# Patient Record
Sex: FEMALE | Race: WHITE | NOT HISPANIC OR LATINO | Employment: PART TIME | URBAN - METROPOLITAN AREA
[De-identification: names, ages, dates, MRNs, and addresses within clinical notes are randomized per-mention and may not be internally consistent; named-entity substitution may affect disease eponyms.]

---

## 2017-09-20 ENCOUNTER — ALLSCRIPTS OFFICE VISIT (OUTPATIENT)
Dept: OTHER | Facility: OTHER | Age: 57
End: 2017-09-20

## 2017-10-18 ENCOUNTER — GENERIC CONVERSION - ENCOUNTER (OUTPATIENT)
Dept: OTHER | Facility: OTHER | Age: 57
End: 2017-10-18

## 2017-10-18 DIAGNOSIS — Z12.31 ENCOUNTER FOR SCREENING MAMMOGRAM FOR MALIGNANT NEOPLASM OF BREAST: ICD-10-CM

## 2017-12-12 ENCOUNTER — GENERIC CONVERSION - ENCOUNTER (OUTPATIENT)
Dept: OBGYN CLINIC | Facility: CLINIC | Age: 57
End: 2017-12-12

## 2017-12-20 ENCOUNTER — GENERIC CONVERSION - ENCOUNTER (OUTPATIENT)
Dept: OTHER | Facility: OTHER | Age: 57
End: 2017-12-20

## 2017-12-20 ENCOUNTER — ALLSCRIPTS OFFICE VISIT (OUTPATIENT)
Dept: OTHER | Facility: OTHER | Age: 57
End: 2017-12-20

## 2017-12-20 DIAGNOSIS — N64.4 MASTODYNIA: ICD-10-CM

## 2017-12-21 NOTE — PROGRESS NOTES
Assessment   1  Breast pain, left (611 71) (N64 4)    Plan   Breast pain, left    · *US BREAST LEFT LIMITED (DIAGNOSTIC); Status:Hold For - Scheduling; Requested    for:88Vgb0745;    Perform:[facility]iology; Due:83Exy0779; Ordered; For:Breast pain, left; Ordered By:Samantha Matta;    Discussion/Summary   Discussion Summary:    Reassured pt most likely musculoskeletal, pt still concerned, ordered L breast US for pt reassurance US WNL, pain continues rec f/u with plastic surgeon who performed augmentation or RTO if symptoms worsen or do not improve  Chief Complaint   Chief Complaint Free Text Note Form: Acute Visit here for breast check, states she is having breast tenderness on left breast for a few weeks  Pt does self breast check daily and denies any lumps  History of Present Illness   HPI: 55 y/o female who presents to the office L breast pain x 3 wks  Pt states that the pain started before her most recent mammo, mammo WNL  But pt concerned because has B/L silicone breast implants and mammo tech had difficult time getting all views  Pt reports that the pain is worsened when she lifts her L arm up  Pt denies any nipple d/c, palpable lump, or axillary lymph nodes  Review of Systems   Focused-Female:      Constitutional: No fever, no chills, feels well, no tiredness, no recent weight gain or loss  Breasts: breast pain, but-- no breast swelling,-- no reddening of the breast,-- no breast lump,-- no breast itching-- and-- no nipple discharge  Active Problems   1  Asymptomatic age-related postmenopausal state (V49 81) (Z78 0)   2  Bacterial vaginosis (616 10,041 9) (N76 0,B96 89)   3  Encounter for gynecological examination without abnormal finding (V72 31) (Z01 419)   4  Encounter for screening mammogram for malignant neoplasm of breast (V76 12)     (Z12 31)   5  Height loss (781 91) (R29 890)    Past Medical History   1  History of Anxiety (300 00) (F41 9)   2   History of  3 (V22 2) (Z33 1)   3  History of spontaneous  (V13 29) (Z87 59)   4  History of thyroid disease (V12 29) (Z86 39)   5  History of Hot flashes (627 2) (N95 1)   6  History of Menarche (V21 8)   7  History of Menopause (627 2) (Z78 0)   8  History of  (spontaneous vaginal delivery) (650) (O80)   9  History of Twin birth (651 00) (Z38 5)  Active Problems And Past Medical History Reviewed: The active problems and past medical history were reviewed and updated today  Surgical History   1  History of Breast Surgery Enlargement Procedure   2  History of  Section   3  History of Complete Colonoscopy   4  History of Surgical Treatment Of Missed    5  History of Tonsillectomy  Surgical History Reviewed: The surgical history was reviewed and updated today  Family History   Mother    1  Family history of Diabetes Mellitus (V18 0)   2  Family history of atrial fibrillation (V17 49) (Z82 49)   3  Family history of congestive heart failure (V17 49) (Z82 49)   4  Family history of rheumatoid arthritis (V17 7) (Z82 61)  Father    5  Family history of cerebrovascular accident (V17 1) (Z82 3)  Maternal Aunt    6  Family history of malignant neoplasm of breast (V16 3) (Z80 3)  Family History    7  Family history of Anemia (V18 2)   8  Family history of Arthritis (V17 7)   9  Family history of Blood Transfusion (___ Ml)   10  Family history of Hypertension (V17 49)   11  Family history of Reported Family History Of Heart Disease   12  Family history of Thyroid Disorder (V18 19)  Family History Reviewed: The family history was reviewed and updated today  Social History    · Denied: History of Being A Social Drinker   · Denied: History of Current Some Day Smoker   · Daily Coffee Consumption (2  Cups/Day)   · Lack of exercise (V69 0) (Z72 3)   · Never a smoker   · Partner had vasectomy  Social History Reviewed: The social history was reviewed and updated today   The social history was reviewed and is unchanged  Current Meds    1  Calcium 600 + D TABS; Therapy: (Recorded:00Ref5930) to Recorded   2  Centrum Silver Oral Tablet; Therapy: (Recorded:30Zig4372) to Recorded   3  Citalopram Hydrobromide 20 MG Oral Tablet; Therapy: 46JBI5522 to (Last XR:86EUS3870)  Requested for: 99VBY2961 Ordered   4  Multi-Day Vitamins TABS; Therapy: (Recorded:00Mdp1402) to Recorded  Medication List Reviewed: The medication list was reviewed and updated today  Allergies   1  Minocycline HCl CAPS    Vitals   Vital Signs    Recorded: 86INI6213 26:16IP   Systolic 054, RUE, Sitting   Diastolic 60, RUE, Sitting   Weight 131 lb    BMI Calculated 21 47   BSA Calculated 1 66   LMP      Physical Exam        Constitutional      General appearance: No acute distress, well appearing and well nourished  Chest      Breasts: Normal and no dimpling or skin changes noted  implants noted        Psychiatric      Orientation to person, place, and time: Normal        Mood and affect: Normal        Future Appointments      Date/Time Provider Specialty Site   10/24/2018 01:40 PM Kat Evans, [address] [place] [address] [facility]     Signatures    Electronically signed by : Deedee Collins [facility]; Dec 20 2017  3:24PM EST                       (Author)     Electronically signed by : RODNEY Anglin ; Dec 20 2017  4:37PM EST

## 2018-01-11 NOTE — MISCELLANEOUS
Provider Comments  Provider Comments:   CALLED PT LMTCB AND R/S APPT  Signatures   Electronically signed by :  Michele Cervantes, ; Sep 20 2017 10:34AM EST                       (Author)

## 2018-01-22 VITALS — SYSTOLIC BLOOD PRESSURE: 102 MMHG | WEIGHT: 131 LBS | BODY MASS INDEX: 21.47 KG/M2 | DIASTOLIC BLOOD PRESSURE: 60 MMHG

## 2018-01-22 VITALS
HEIGHT: 66 IN | WEIGHT: 130 LBS | SYSTOLIC BLOOD PRESSURE: 100 MMHG | DIASTOLIC BLOOD PRESSURE: 68 MMHG | BODY MASS INDEX: 20.89 KG/M2

## 2018-01-23 NOTE — MISCELLANEOUS
Message   Recorded as Task   Date: 12/20/2017 10:13 AM, Created By: Cherrie Spencer   Task Name: Call Back   Assigned To: Leanne Graves   Regarding Patient: Nathanael Villagran, Status: In Progress   Comment:    Starla He - 20 Dec 2017 10:13 AM     TASK CREATED  Caller: Self; Results Inquiry; [phone] (Mobile Phone)  Pt calling for results of Eddie Solange - 20 Dec 2017 10:17 AM     TASK IN PROGRESS   Fe Ashley - 20 Dec 2017 10:31 AM     TASK EDITED  Pt said she had breast pain when she had mammo - but didn't say anything  Pt wants ov  Done        Active Problems    1  Asymptomatic age-related postmenopausal state (V49 81) (Z78 0)   2  Bacterial vaginosis (616 10,041 9) (N76 0,B96 89)   3  Encounter for gynecological examination without abnormal finding (V72 31) (Z01 419)   4  Encounter for screening mammogram for malignant neoplasm of breast (V76 12)   (Z12 31)   5  Height loss (781 91) (R29 890)    Current Meds   1  Calcium 600 + D TABS; Therapy: (Recorded:40Sdv8175) to Recorded   2  Centrum Silver Oral Tablet; Therapy: (Recorded:07Nec8389) to Recorded   3  Citalopram Hydrobromide 20 MG Oral Tablet; Therapy: 84PCH2142 to (Last YH:35WCU4591)  Requested for: 93MLO6391 Ordered   4  Multi-Day Vitamins TABS; Therapy: (Recorded:84Oet3353) to Recorded    Allergies    1  Minocycline HCl CAPS    Signatures   Electronically signed by :  Guy Albert, ; Dec 20 2017 10:31AM EST                       (Author)

## 2018-11-30 ENCOUNTER — ANNUAL EXAM (OUTPATIENT)
Dept: OBGYN CLINIC | Facility: MEDICAL CENTER | Age: 58
End: 2018-11-30
Payer: COMMERCIAL

## 2018-11-30 VITALS
SYSTOLIC BLOOD PRESSURE: 102 MMHG | BODY MASS INDEX: 21.69 KG/M2 | DIASTOLIC BLOOD PRESSURE: 60 MMHG | HEIGHT: 66 IN | WEIGHT: 135 LBS

## 2018-11-30 DIAGNOSIS — Z12.31 SCREENING MAMMOGRAM, ENCOUNTER FOR: Primary | ICD-10-CM

## 2018-11-30 DIAGNOSIS — Z01.419 ENCOUNTER FOR GYNECOLOGICAL EXAMINATION WITHOUT ABNORMAL FINDING: ICD-10-CM

## 2018-11-30 DIAGNOSIS — R92.2 DENSE BREAST TISSUE: ICD-10-CM

## 2018-11-30 DIAGNOSIS — N95.2 ATROPHIC VAGINITIS: ICD-10-CM

## 2018-11-30 PROCEDURE — S0612 ANNUAL GYNECOLOGICAL EXAMINA: HCPCS | Performed by: PHYSICIAN ASSISTANT

## 2018-11-30 RX ORDER — ESTRADIOL 0.1 MG/G
CREAM VAGINAL
Qty: 42.5 G | Refills: 2 | Status: SHIPPED | OUTPATIENT
Start: 2018-11-30

## 2018-11-30 RX ORDER — CITALOPRAM 20 MG/1
TABLET ORAL
COMMUNITY
Start: 2018-11-20

## 2018-11-30 NOTE — ASSESSMENT & PLAN NOTE
Benign findings on routine gyn exam  Recommended monthly SBE, annual CBE and annual screening mammo  ASCCP guidelines reviewed and pap with cotesting noted to be up to date; this low risk patient was advised she meets criteria to d/c pap screening at age 74  Colonoscopy noted to be up to date  The patient denies STI risk factors and declines testing at this time  Reviewed diet/activity recommendations  Discussed postmenopausal considerations and symptoms to report   RTO in one year for routine annual gyn exam or sooner PRN

## 2018-11-30 NOTE — ASSESSMENT & PLAN NOTE
Reviewed tx options - Will rx trial of Estrace cream, discussed correct use/administration and possible SE  All questions answered

## 2018-11-30 NOTE — PROGRESS NOTES
Assessment/Plan:    Atrophic vaginitis  Reviewed tx options - Will rx trial of Estrace cream, discussed correct use/administration and possible SE  All questions answered      Encounter for gynecological examination without abnormal finding  Benign findings on routine gyn exam  Recommended monthly SBE, annual CBE and annual screening mammo  ASCCP guidelines reviewed and pap with cotesting noted to be up to date; this low risk patient was advised she meets criteria to d/c pap screening at age 74  Colonoscopy noted to be up to date  The patient denies STI risk factors and declines testing at this time  Reviewed diet/activity recommendations  Discussed postmenopausal considerations and symptoms to report  RTO in one year for routine annual gyn exam or sooner PRN       Diagnoses and all orders for this visit:    Screening mammogram, encounter for  -     Mammo screening bilateral w 3d & cad; Future    Dense breast tissue  -     Mammo screening bilateral w 3d & cad; Future    Encounter for gynecological examination without abnormal finding    Atrophic vaginitis  -     estradiol (ESTRACE) 0 1 mg/g vaginal cream; Insert 1g PV nightly x 2 weeks, then twice weekly    Other orders  -     citalopram (CeleXA) 20 mg tablet;         Subjective:      Patient ID: Aisha Victor is a 66 y o  female  The patient comes in today for annual Gyn exam  The patient is postmenopausal - no hx PMB, pelvic pain, abnormal vaginal discharge, breast mass or lumps, urinary symptoms, urinary incontinence, depression, and pelvic/vaginal pressure  States she has vaginal pain, rikki w/ intercourse since going through menopause  Has tried OTC lubricants w/out success  Pt reports all additional systems reviewed are negative  The patient admits to monthly self breast exams, regular exercise, healthy diet, contraception use (postmenopausal), and calcium and Vitamin D intake    PAP up to date, HPV neg/cytology WNL in 2016  mammo 2017, WNL  No sig family hx  Pt is a  at [address] [place] [address] Exam   The patient's pertinent negatives include no pelvic pain or vaginal discharge  Pertinent negatives include no abdominal pain, constipation, diarrhea, headaches, nausea or vomiting  The following portions of the patient's history were reviewed and updated as appropriate: allergies, current medications, past family history, past medical history, past social history, past surgical history and problem list     Review of Systems   Constitutional: Negative for appetite change, fatigue and unexpected weight change  Respiratory: Negative for chest tightness and shortness of breath  Cardiovascular: Negative for chest pain, palpitations and leg swelling  Gastrointestinal: Negative for abdominal pain, constipation, diarrhea, nausea and vomiting  Genitourinary: Positive for dyspareunia and vaginal pain  Negative for difficulty urinating, menstrual problem, pelvic pain and vaginal discharge  Musculoskeletal: Negative for arthralgias and myalgias  Neurological: Negative for dizziness, light-headedness and headaches  Psychiatric/Behavioral: Negative for dysphoric mood  The patient is not nervous/anxious  All other systems reviewed and are negative  Objective:      /60 (BP Location: Left arm, Patient Position: Sitting)   Ht 5' 5 5" (1 664 m)   Wt 61 2 kg (135 lb)   BMI 22 12 kg/m²          Physical Exam   Constitutional: She is oriented to person, place, and time  She appears well-developed and well-nourished  HENT:   Head: Normocephalic and atraumatic  Neck: Neck supple  No thyromegaly present  Cardiovascular: Normal rate and regular rhythm  Pulmonary/Chest: Effort normal and breath sounds normal  Right breast exhibits no inverted nipple, no mass, no nipple discharge, no skin change and no tenderness  Left breast exhibits no inverted nipple, no mass, no nipple discharge, no skin change and no tenderness     Abdominal: Soft  Bowel sounds are normal  There is no tenderness  Hernia confirmed negative in the right inguinal area and confirmed negative in the left inguinal area  Genitourinary: Vagina normal and uterus normal  No breast swelling, tenderness, discharge or bleeding  Pelvic exam was performed with patient supine  There is no rash, tenderness, lesion or injury on the right labia  There is no rash, tenderness, lesion or injury on the left labia  Uterus is not deviated, not enlarged, not fixed and not tender  Cervix exhibits no motion tenderness, no discharge and no friability  Right adnexum displays no mass, no tenderness and no fullness  Left adnexum displays no mass, no tenderness and no fullness  No erythema, tenderness or bleeding in the vagina  No signs of injury around the vagina  No vaginal discharge found  Genitourinary Comments: Atrophic changes noted    Neurological: She is alert and oriented to person, place, and time  Skin: Skin is warm and dry  Psychiatric: She has a normal mood and affect  Nursing note and vitals reviewed

## 2019-12-10 ENCOUNTER — ANNUAL EXAM (OUTPATIENT)
Dept: OBGYN CLINIC | Facility: MEDICAL CENTER | Age: 59
End: 2019-12-10
Payer: COMMERCIAL

## 2019-12-10 VITALS
BODY MASS INDEX: 21.69 KG/M2 | HEIGHT: 67 IN | DIASTOLIC BLOOD PRESSURE: 76 MMHG | WEIGHT: 138.2 LBS | SYSTOLIC BLOOD PRESSURE: 100 MMHG

## 2019-12-10 DIAGNOSIS — Z01.419 ENCOUNTER FOR GYNECOLOGICAL EXAMINATION WITHOUT ABNORMAL FINDING: Primary | ICD-10-CM

## 2019-12-10 DIAGNOSIS — Z12.31 ENCOUNTER FOR SCREENING MAMMOGRAM FOR MALIGNANT NEOPLASM OF BREAST: ICD-10-CM

## 2019-12-10 DIAGNOSIS — Z11.51 SCREENING FOR HPV (HUMAN PAPILLOMAVIRUS): ICD-10-CM

## 2019-12-10 DIAGNOSIS — N95.2 ATROPHIC VAGINITIS: ICD-10-CM

## 2019-12-10 PROCEDURE — G0145 SCR C/V CYTO,THINLAYER,RESCR: HCPCS | Performed by: NURSE PRACTITIONER

## 2019-12-10 PROCEDURE — 87624 HPV HI-RISK TYP POOLED RSLT: CPT | Performed by: NURSE PRACTITIONER

## 2019-12-10 PROCEDURE — S0612 ANNUAL GYNECOLOGICAL EXAMINA: HCPCS | Performed by: NURSE PRACTITIONER

## 2019-12-10 NOTE — ASSESSMENT & PLAN NOTE
Benign findings on routine gyn exam  Recommended monthly SBE, annual CBE and annual screening mammo  ASCCP guidelines reviewed and pap with cotesting done today; this low risk patient was advised she meets criteria to d/c pap screening at age 74  Colonoscopy noted to be up to date-patient states 4-5 years ago  The patient denies STI risk factors and declines testing at this time  Reviewed diet/activity recommendations:  Encouraged daily Ca++ and vitamin D intake as well as daily weight bearing exercise for promotion of bone health    Discussed postmenopausal considerations and symptoms to report  RTO in one year for routine annual gyn exam or sooner PRN

## 2019-12-10 NOTE — PATIENT INSTRUCTIONS
Vaginal Atrophy   WHAT YOU NEED TO KNOW:   What is vaginal atrophy? Vaginal atrophy is a condition that causes thinning, drying, and inflammation of vaginal tissue  This condition is caused by decreased levels of estrogen (a female sex hormone)  Vaginal atrophy can increase your risk for vaginal and urinary tract infections  Vaginal atrophy can worsen over time if not treated  What causes or increases your risk of vaginal atrophy? · Menopause     · Medicines that lower your estrogen levels, such as those used to treat breast cancer, endometriosis, or fibroids    · Radiation to your pelvic area     · Surgery to remove the ovaries    · Breastfeeding  What are the signs and symptoms of vaginal atrophy? · Vaginal dryness, itching, and burning    · Vaginal discharge    · Pain or discomfort during sex    · Light bleeding after sex    · Burning during urination    · Frequent, sudden, strong urges to urinate    · Urinary incontinence (loss of control of your bladder)  How is vaginal atrophy diagnosed? Your healthcare provider will ask about your symptoms  A pelvic exam will be done to examine your vagina and cervix  Your healthcare provider will place a speculum into your vagina to open and examine it  A sample of discharge from your vagina may be collected and tested  A urine test may also be done  How is vaginal atrophy treated? · Over-the counter vaginal moisturizers  can help reduce dryness  Your healthcare provider may recommend that you use a vaginal moisturizer several times each week and during sex  Only use creams that are made for vaginal use  Do  not  use petroleum jelly  Lubricants can be used during sex to decrease pain and discomfort  · Estrogen  may help decrease dryness  It may also lower your risk of vaginal infections if you are going through menopause  It can also help to relieve urinary symptoms  Estrogen may be prescribed in the form of a cream, tablet, or ring   These medicines can be applied or inserted into the vagina  Estrogen can also be prescribed in the form of a pill  When should I contact my healthcare provider? · You have a foul-smelling odor coming from your vagina  · You have a thick, cheese-like discharge from your vagina  · You have itching, swelling, or redness in your vagina  · You have pain or burning when you urinate  · Your urine smells bad  · Your symptoms do not improve, or they get worse  · You have questions or concerns about your condition or care  CARE AGREEMENT:   You have the right to help plan your care  Learn about your health condition and how it may be treated  Discuss treatment options with your caregivers to decide what care you want to receive  You always have the right to refuse treatment  The above information is an  only  It is not intended as medical advice for individual conditions or treatments  Talk to your doctor, nurse or pharmacist before following any medical regimen to see if it is safe and effective for you  © 2017 [employer] Information is for End User's use only and may not be sold, redistributed or otherwise used for commercial purposes  All illustrations and images included in CareNotes® are the copyrighted property of [employer]  or [employer]

## 2019-12-10 NOTE — ASSESSMENT & PLAN NOTE
Patient never started estrogen cream   Will consider starting now  Also recommend water based lubricants

## 2019-12-10 NOTE — PROGRESS NOTES
Assessment/Plan:    Encounter for gynecological examination without abnormal finding  Benign findings on routine gyn exam  Recommended monthly SBE, annual CBE and annual screening mammo  ASCCP guidelines reviewed and pap with cotesting done today; this low risk patient was advised she meets criteria to d/c pap screening at age 74  Colonoscopy noted to be up to date-patient states 4-5 years ago  The patient denies STI risk factors and declines testing at this time  Reviewed diet/activity recommendations:  Encouraged daily Ca++ and vitamin D intake as well as daily weight bearing exercise for promotion of bone health    Discussed postmenopausal considerations and symptoms to report  RTO in one year for routine annual gyn exam or sooner PRN  Atrophic vaginitis  Patient never started estrogen cream   Will consider starting now  Also recommend water based lubricants  Diagnoses and all orders for this visit:    Encounter for gynecological examination without abnormal finding    Encounter for screening mammogram for malignant neoplasm of breast  -     Mammo screening bilateral w 3d & cad; Future    Atrophic vaginitis        Taylor Deirdre   1960    CC:  Yearly exam    S:Twyla is a  64 y o  female here for yearly exam  She is postmenopausal and has had no vaginal bleeding  She denies abnormal vaginal discharge, itching or odor  She does report vaginal dryness  She denies breast concerns, abdominal/pelvic pain or bladder/bowel dysfunction  Sexual activity: She is rarely sexually active due to pain with IC secondary to atrophic changes  STD testing: She does not want STD testing today  She works at [employer]  She has a 23year old son and twins (boy and girl) conceived with IVF        Last Pap/HPV: 9/16 neg/neg   Last Mammo: 12/17 BRAYAN 1  SBE: monthly   Last Colonoscopy: states up to date    Family hx of breast cancer: maternal aunt   Family hx of ovarian cancer: denies   Family hx of colon cancer: paternal aunt     Current Outpatient Medications:     citalopram (CeleXA) 20 mg tablet, , Disp: , Rfl:     estradiol (ESTRACE) 0 1 mg/g vaginal cream, Insert 1g PV nightly x 2 weeks, then twice weekly (Patient not taking: Reported on 12/10/2019), Disp: 42 5 g, Rfl: 2  Social History     Socioeconomic History    Marital status: /Civil Union     Spouse name: Not on file    Number of children: Not on file    Years of education: Not on file    Highest education level: Not on file   Occupational History    Not on file   Social Needs    Financial resource strain: Not on file    Food insecurity:     Worry: Not on file     Inability: Not on file    Transportation needs:     Medical: Not on file     Non-medical: Not on file   Tobacco Use    Smoking status: Never Smoker    Smokeless tobacco: Never Used   Substance and Sexual Activity    Alcohol use: Not Currently    Drug use: Never    Sexual activity: Not Currently     Birth control/protection: Post-menopausal   Lifestyle    Physical activity:     Days per week: Not on file     Minutes per session: Not on file    Stress: Not on file   Relationships    Social connections:     Talks on phone: Not on file     Gets together: Not on file     Attends Islam service: Not on file     Active member of club or organization: Not on file     Attends meetings of clubs or organizations: Not on file     Relationship status: Not on file    Intimate partner violence:     Fear of current or ex partner: Not on file     Emotionally abused: Not on file     Physically abused: Not on file     Forced sexual activity: Not on file   Other Topics Concern    Not on file   Social History Narrative    Not on file     Family History   Problem Relation Age of Onset    Atrial fibrillation Mother     Diabetes Mother     Heart failure Mother     Rheum arthritis Mother     Heart disease Father     Thyroid disease Sister     Thyroid disease Brother     No Known Problems Maternal Grandmother     No Known Problems Maternal Grandfather     No Known Problems Paternal Grandmother     No Known Problems Paternal Grandfather     Thyroid disease Sister      History reviewed  No pertinent past medical history  Review of Systems   Constitutional: Negative for appetite change, fatigue and unexpected weight change  Respiratory: Negative for shortness of breath  Cardiovascular: Negative for chest pain and leg swelling  Gastrointestinal: Negative for abdominal pain  Endocrine: Negative for cold intolerance and heat intolerance  Breasts:  Negative for breast tenderness or masses  Genitourinary: Negative  Negative for , dysuria, flank pain, frequency, genital sores, hematuria, post menopausal bleeding and pelvic pain  Positive for dyspareunia and vaginal dryness  Negative for stress incontinence  Musculoskeletal: Negative for back pain  Neurological: Negative for headaches  O:  Blood pressure 100/76, height 5' 7" (1 702 m), weight 62 7 kg (138 lb 3 2 oz)  Patient appears well and is not in distress  Neck is supple without masses  Normal thyroid  Heart regular rate and rhythm  Lungs CTA bilaterally   Breasts are symmetrical without mass, tenderness, nipple discharge, skin changes or adenopathy  + silicone implants bilaterally  Abdomen is soft and nontender without masses  External genitals are normal without lesions or rashes  Urethral meatus and urethra are normal  Bladder is normal to palpation  Vagina is normal without discharge or bleeding  Atrophic changes noted  Cervix is normal without discharge or lesion  Uterus is normal, mobile, nontender without palpable mass  Adnexa are normal, nontender, without palpable mass

## 2019-12-12 LAB
HPV HR 12 DNA CVX QL NAA+PROBE: NEGATIVE
HPV16 DNA CVX QL NAA+PROBE: NEGATIVE
HPV18 DNA CVX QL NAA+PROBE: NEGATIVE

## 2019-12-13 LAB
LAB AP GYN PRIMARY INTERPRETATION: NORMAL
Lab: NORMAL

## 2019-12-16 ENCOUNTER — TELEPHONE (OUTPATIENT)
Dept: OBGYN CLINIC | Facility: MEDICAL CENTER | Age: 59
End: 2019-12-16

## 2020-01-03 ENCOUNTER — TELEPHONE (OUTPATIENT)
Dept: OBGYN CLINIC | Facility: CLINIC | Age: 60
End: 2020-01-03

## 2020-01-03 NOTE — TELEPHONE ENCOUNTER
Working the over due bin, contacted pt and informed friendly reminder mammogram is over due  Pt stated she doesn't like doing them and that's why she has been postponing  Pt stated she may do it in the summer and requested I call her back in the summer   I postponed mammogram order til 06/2020

## 2020-06-18 ENCOUNTER — TELEPHONE (OUTPATIENT)
Dept: OBGYN CLINIC | Facility: CLINIC | Age: 60
End: 2020-06-18

## 2021-03-15 ENCOUNTER — ANNUAL EXAM (OUTPATIENT)
Dept: OBGYN CLINIC | Facility: MEDICAL CENTER | Age: 61
End: 2021-03-15
Payer: COMMERCIAL

## 2021-03-15 VITALS — DIASTOLIC BLOOD PRESSURE: 70 MMHG | WEIGHT: 138.2 LBS | BODY MASS INDEX: 21.65 KG/M2 | SYSTOLIC BLOOD PRESSURE: 100 MMHG

## 2021-03-15 DIAGNOSIS — Z01.419 ENCOUNTER FOR GYNECOLOGICAL EXAMINATION WITHOUT ABNORMAL FINDING: Primary | ICD-10-CM

## 2021-03-15 PROCEDURE — S0612 ANNUAL GYNECOLOGICAL EXAMINA: HCPCS | Performed by: NURSE PRACTITIONER

## 2021-03-15 NOTE — ASSESSMENT & PLAN NOTE
Benign findings on routine gyn exam  Recommended monthly SBE, annual CBE and annual screening mammo  ASCCP guidelines reviewed and pap with cotesting noted to be up to date; this low risk patient was advised she meets criteria to d/c pap screening at age 74  Colonoscopy noted to be up to date  The patient denies STI risk factors and declines testing at this time  Reviewed diet/activity recommendations:  Encouraged daily Ca++ and vitamin D intake as well as daily weight bearing exercise for promotion of bone health    Discussed postmenopausal considerations and symptoms to report  RTO in one year for routine annual gyn exam or sooner PRN

## 2021-09-09 ENCOUNTER — TELEPHONE (OUTPATIENT)
Dept: GASTROENTEROLOGY | Facility: CLINIC | Age: 61
End: 2021-09-09

## 2022-03-29 ENCOUNTER — ANNUAL EXAM (OUTPATIENT)
Dept: OBGYN CLINIC | Facility: MEDICAL CENTER | Age: 62
End: 2022-03-29
Payer: COMMERCIAL

## 2022-03-29 VITALS
BODY MASS INDEX: 21.69 KG/M2 | HEIGHT: 66 IN | DIASTOLIC BLOOD PRESSURE: 62 MMHG | SYSTOLIC BLOOD PRESSURE: 100 MMHG | WEIGHT: 135 LBS

## 2022-03-29 DIAGNOSIS — Z12.31 ENCOUNTER FOR SCREENING MAMMOGRAM FOR MALIGNANT NEOPLASM OF BREAST: ICD-10-CM

## 2022-03-29 DIAGNOSIS — Z12.11 ENCOUNTER FOR SCREENING FOR MALIGNANT NEOPLASM OF COLON: Primary | ICD-10-CM

## 2022-03-29 DIAGNOSIS — Z01.419 ENCOUNTER FOR GYNECOLOGICAL EXAMINATION WITHOUT ABNORMAL FINDING: ICD-10-CM

## 2022-03-29 PROCEDURE — S0612 ANNUAL GYNECOLOGICAL EXAMINA: HCPCS | Performed by: NURSE PRACTITIONER

## 2022-03-29 RX ORDER — CHLORAL HYDRATE 500 MG
2 CAPSULE ORAL DAILY
COMMUNITY

## 2022-03-29 RX ORDER — B-COMPLEX WITH VITAMIN C
1 TABLET ORAL 2 TIMES DAILY WITH MEALS
COMMUNITY

## 2022-03-29 RX ORDER — FEXOFENADINE HCL 180 MG/1
180 TABLET ORAL DAILY
COMMUNITY

## 2022-03-29 RX ORDER — DIPHENOXYLATE HYDROCHLORIDE AND ATROPINE SULFATE 2.5; .025 MG/1; MG/1
1 TABLET ORAL DAILY
COMMUNITY

## 2022-03-29 NOTE — PROGRESS NOTES
Encounter for gynecological examination without abnormal finding  Benign findings on routine gyn exam  Recommended monthly SBE, annual CBE and annual screening mammo  ASCCP guidelines reviewed and pap with cotesting noted to be up to date; this low risk patient was advised she meets criteria to d/c pap screening at age 74  Colonoscopy up to date  The patient denies STI risk factors and declines testing at this time  Reviewed diet/activity recommendations:  Encouraged daily Ca++ and vitamin D intake as well as daily weight bearing exercise for promotion of bone health    Discussed postmenopausal considerations and symptoms to report  RTO in one year for routine annual gyn exam or sooner PRN  Diagnoses and all orders for this visit:    Encounter for screening for malignant neoplasm of colon  -     Cancel: Ambulatory referral to Gastroenterology; Future    Encounter for screening mammogram for malignant neoplasm of breast  -     Mammo screening bilateral w 3d & cad; Future    Encounter for gynecological examination without abnormal finding         Jahaira Jessica   1960    CC:  Yearly exam    S:  Jahaira Jessica is a 63 y o  female here for yearly exam  She is postmenopausal since her early 46s and has had no vaginal bleeding  She denies abnormal vaginal discharge, itching, odor or dryness  She denies breast concerns, abdominal/pelvic pain or bladder/bowel dysfunction  Denies stress incontinence and has rare hot flashes  Sexual activity: She is not sexually active due to pain with IC secondary to atrophic changes  Never started estrace cream  States she is OK with not being sexually active  Not interested in medication for atrophy or HRT  Advised to call if changes her mind-could try osphena or intrarosa  STD testing: She does not want STD testing today       Last Pap: 12/19 neg/neg  Last Mammo: 9/21 BRAYAN 1  SBE: yes   Last Colonoscopy:2017 per patient due in 10    We reviewed ASCCP guidelines for Pap testing       Family hx of breast cancer: maternal aunt   Family hx of ovarian cancer: denies   Family hx of colon cancer: paternal aunt    Works in [employer]     Current Outpatient Medications:     ascorbic acid (VITAMIN C) 1000 MG tablet, Take 1,000 mg by mouth daily, Disp: , Rfl:     calcium carbonate-vitamin D (OSCAL-D) 500 mg-200 units per tablet, Take 1 tablet by mouth 2 (two) times a day with meals, Disp: , Rfl:     citalopram (CeleXA) 20 mg tablet, , Disp: , Rfl:     fexofenadine (ALLEGRA) 180 MG tablet, Take 180 mg by mouth daily, Disp: , Rfl:     multivitamin (THERAGRAN) TABS, Take 1 tablet by mouth daily, Disp: , Rfl:     Omega-3 1000 MG CAPS, Take 2 g by mouth daily, Disp: , Rfl:     estradiol (ESTRACE) 0 1 mg/g vaginal cream, Insert 1g PV nightly x 2 weeks, then twice weekly (Patient not taking: Reported on 12/10/2019), Disp: 42 5 g, Rfl: 2  Social History     Socioeconomic History    Marital status: /Civil Union     Spouse name: Not on file    Number of children: Not on file    Years of education: Not on file    Highest education level: Not on file   Occupational History    Not on file   Tobacco Use    Smoking status: Never Smoker    Smokeless tobacco: Never Used   Substance and Sexual Activity    Alcohol use: Not Currently    Drug use: Never    Sexual activity: Not Currently     Birth control/protection: Post-menopausal   Other Topics Concern    Not on file   Social History Narrative    Not on file     Social Determinants of Health     Financial Resource Strain: Not on file   Food Insecurity: Not on file   Transportation Needs: Not on file   Physical Activity: Not on file   Stress: Not on file   Social Connections: Not on file   Intimate Partner Violence: Not on file   Housing Stability: Not on file     Family History   Problem Relation Age of Onset    Atrial fibrillation Mother     Diabetes Mother     Heart failure Mother     Rheum arthritis Mother  Heart disease Father     Thyroid disease Sister     Thyroid disease Brother     No Known Problems Maternal Grandmother     No Known Problems Maternal Grandfather     No Known Problems Paternal Grandmother     No Known Problems Paternal Grandfather     Thyroid disease Sister     Breast cancer Maternal Aunt     Colon cancer Paternal Aunt     Ovarian cancer Neg Hx     Cervical cancer Neg Hx     Uterine cancer Neg Hx      History reviewed  No pertinent past medical history  Review of Systems   Constitutional: Negative for appetite change, fatigue and unexpected weight change  Respiratory: Negative for shortness of breath  Cardiovascular: Negative for chest pain and leg swelling  Gastrointestinal: Negative for abdominal pain  Endocrine: Negative for cold intolerance and heat intolerance  Breasts:  Negative for breast tenderness or masses  Genitourinary: Negative for dysuria, flank pain, frequency, genital sores, hematuria and pelvic pain  + for vaginal dryness  Negative for stress incontinence  Musculoskeletal: Negative for back pain  Neurological: Negative for headaches  O:  Blood pressure 100/62, height 5' 6" (1 676 m), weight 61 2 kg (135 lb)  Patient appears well and is not in distress  Neck is supple without masses  Normal thyroid  Heart regular rate and rhythm  Lungs CTA bilaterally   Breasts are symmetrical without mass, tenderness, nipple discharge, skin changes or adenopathy    + implants bilaterally   Abdomen is soft and nontender without masses  External genitals are normal without lesions or rashes  Urethral meatus and urethra are normal  Bladder is normal to palpation  Vagina is normal without discharge or bleeding    + atrophic changes  Very uncomfortable with speculum exam -used pediatric speculum  Cervix is normal without discharge or lesion  Uterus is normal, mobile, nontender without palpable mass  Adnexa are normal, nontender, without palpable mass  Skin warm and dry   Capillary refill < 2 seconds  Alert and oriented x 3 with normal affect

## 2022-03-29 NOTE — ASSESSMENT & PLAN NOTE
Benign findings on routine gyn exam  Recommended monthly SBE, annual CBE and annual screening mammo  ASCCP guidelines reviewed and pap with cotesting noted to be up to date; this low risk patient was advised she meets criteria to d/c pap screening at age 74  Colonoscopy up to date  The patient denies STI risk factors and declines testing at this time  Reviewed diet/activity recommendations:  Encouraged daily Ca++ and vitamin D intake as well as daily weight bearing exercise for promotion of bone health    Discussed postmenopausal considerations and symptoms to report  RTO in one year for routine annual gyn exam or sooner PRN

## 2022-06-17 ENCOUNTER — TELEPHONE (OUTPATIENT)
Dept: OBGYN CLINIC | Facility: CLINIC | Age: 62
End: 2022-06-17

## 2022-06-17 NOTE — TELEPHONE ENCOUNTER
Pt called and informed that she got a missed call from us and she said Vm said woman was calling from  to schedule a test  I told pt I didn't see anything in her phone in regards to phone note and the only 'test' I saw was an order for a mammo, which I told her isnt due until sept  I said maybe someone was calling to remind her to schedule this but I wasn't sure  Told her I didn't have any information for her since the vm she was left was vague  I did provide pt central scheduling number so she could schedule mammo   Apologized to her that I didn't have more information for her

## 2023-04-04 ENCOUNTER — ANNUAL EXAM (OUTPATIENT)
Dept: OBGYN CLINIC | Facility: MEDICAL CENTER | Age: 63
End: 2023-04-04

## 2023-04-04 VITALS
DIASTOLIC BLOOD PRESSURE: 64 MMHG | HEIGHT: 66 IN | SYSTOLIC BLOOD PRESSURE: 102 MMHG | WEIGHT: 129.2 LBS | BODY MASS INDEX: 20.76 KG/M2

## 2023-04-04 DIAGNOSIS — N95.2 ATROPHIC VAGINITIS: ICD-10-CM

## 2023-04-04 DIAGNOSIS — Z12.31 ENCOUNTER FOR SCREENING MAMMOGRAM FOR MALIGNANT NEOPLASM OF BREAST: ICD-10-CM

## 2023-04-04 DIAGNOSIS — Z01.419 ENCOUNTER FOR ANNUAL ROUTINE GYNECOLOGICAL EXAMINATION: Primary | ICD-10-CM

## 2023-04-04 DIAGNOSIS — Z12.11 ENCOUNTER FOR SCREENING COLONOSCOPY: ICD-10-CM

## 2023-04-04 RX ORDER — ESTRADIOL 0.1 MG/G
CREAM VAGINAL
Qty: 42.5 G | Refills: 0 | Status: SHIPPED | OUTPATIENT
Start: 2023-04-04

## 2023-04-04 RX ORDER — COLLAGEN, HYDROLYSATE (BOVINE) 100 %
POWDER (GRAM) MISCELLANEOUS
COMMUNITY

## 2023-04-04 NOTE — PROGRESS NOTES
Assessment/Plan:    65 yo  - annual exam       Problem List Items Addressed This Visit        Genitourinary    Atrophic vaginitis  Discussed estrace vs  Replens  Will try estrace  Relevant Medications    estradiol (ESTRACE VAGINAL) 0 1 mg/g vaginal cream   Other Visit Diagnoses     Encounter for annual routine gynecological examination    -  Primary  Pap due next year    Encounter for screening colonoscopy      Will touch base with GI to see when she's due    Encounter for screening mammogram for malignant neoplasm of breast      Encouraged to complete mammo - overdue  Pt only likes to go every few years due to implants  Discussed benefits of early detection  Relevant Orders    Mammo screening bilateral w 3d & cad            Subjective:      Patient ID: Kurt Harris is a 66 y o  female  This is a 66 y o  postmenopausal  who presents for annual exam      Concerns: interested in talking about something safe to use for dryness  She denies vaginal bleeding, discharge, or pelvic pain  Sexually active: some dryness  STD testing: no  Urinary concerns: no, occasional SI, not bothersome  Bowel movements: no    Screening:  Last pap smear: 12/10/19-neg/neg, never had abnormal  Last mammogram: 9/10/21-neg, has implants  Colon Cancer screening: up-to-date per patient and will obtain records  Per chart last in  but reports she had sigmoidoscopy since then     Family history:  Breast cancer: maternal aunt (38s)  Colon cancer: paternal aunt (later in life -48 - 59s)  Ovarian cancer: no    Body mass index is 21 17 kg/m²  Exercise: yes  Diet:  healthy  Smoking: no        The following portions of the patient's history were reviewed and updated as appropriate: allergies, current medications, past medical history, past social history, past surgical history and problem list     Review of Systems   Constitutional: Negative  HENT: Negative  Eyes: Negative  Respiratory: Negative  "  Cardiovascular: Negative  Gastrointestinal: Negative  Endocrine: Negative  Genitourinary: Negative for dyspareunia, dysuria, frequency, menstrual problem, pelvic pain, vaginal discharge and vaginal pain  Musculoskeletal: Negative  Skin: Negative  Allergic/Immunologic: Negative  Neurological: Negative  Hematological: Negative  Psychiatric/Behavioral: Negative  Objective:      /64 (BP Location: Left arm, Patient Position: Sitting, Cuff Size: Standard)   Ht 5' 5 5\" (1 664 m)   Wt 58 6 kg (129 lb 3 2 oz)   BMI 21 17 kg/m²          Physical Exam  Vitals reviewed  Cardiovascular:      Rate and Rhythm: Normal rate  Pulmonary:      Effort: Pulmonary effort is normal    Chest:   Breasts:     Breasts are symmetrical       Right: No mass, nipple discharge, skin change or tenderness  Left: No mass, nipple discharge, skin change or tenderness  Abdominal:      Palpations: Abdomen is soft  Genitourinary:     Vagina: Normal  No signs of injury  Uterus: Not enlarged and not fixed  Adnexa:         Right: No mass  Left: No mass  Musculoskeletal:      Cervical back: Normal range of motion  Skin:     General: Skin is warm and dry  Neurological:      Mental Status: She is alert and oriented to person, place, and time           "

## 2023-10-25 ENCOUNTER — TELEPHONE (OUTPATIENT)
Dept: OBGYN CLINIC | Facility: CLINIC | Age: 63
End: 2023-10-25

## 2024-02-21 PROBLEM — Z01.419 ENCOUNTER FOR GYNECOLOGICAL EXAMINATION WITHOUT ABNORMAL FINDING: Status: RESOLVED | Noted: 2018-11-30 | Resolved: 2024-02-21

## 2024-06-19 NOTE — PROGRESS NOTES
Subjective:      Taylor Gilmore is a 64 y.o. female. Here for No chief complaint on file.      GYN HPI  Menstrual cycle:  Patient is menopausal.  She denies ***  Vaginal c/o: ***  Urinary c/o: ***  Breast complaints:***  She {DOES/DOES NOT:14169} do self breast Exams    Sexually active: ***  Contraception: n/a  She reports she feels safe at home.     Dietary calcium/vit D  intake: ***  Lifestyle: ***    HEALTH MAINTENANCE SCREENINGS:    Last Papanicolaou test:  12/10/2019   History of abnormal pap: {YES /No:59536}  Last mammogram:  09/10/2021  Last Colon Cancer Screening: colonoscopy: 05/04/2013 Cologuard:Not on file. Recall ***    Hereditary Cancer Screening  Cancer-related family history includes Breast cancer in her maternal aunt; Colon cancer in her paternal aunt. There is no history of Ovarian cancer, Cervical cancer, or Uterine cancer.    Social History     Tobacco Use   Smoking Status Never   Smokeless Tobacco Never      Social History     Substance and Sexual Activity   Alcohol Use Not Currently     Social History     Substance and Sexual Activity   Drug Use Never      Substance Abuse Screening Completed. See hx and flowsheet.    The following portions of the patient's history were reviewed and updated as appropriate: allergies, current medications, past family history, past medical history, past social history, past surgical history, and problem list.  Review of Systems          Objective:  There were no vitals taken for this visit.       OBGyn Exam        Assessment/Plan:           ANNUAL GYN EXAM- Primary  Annual GYN examination completed today.   Health maintenance reviewed/updated as appropriate  Cervical cancer screen: Previous pap smears and ASCCP screening guidelines have been reviewed. Pap collected  .  Breast Health: Encouraged regular self breast exams. Mammo .ordered   Colon cancer screening: up to date    Risk prevention and anticipatory guidance provided including:  Age related Calcium and  vitamin D intake  Dietary and lifestyle recommendations based on her age and weight. body mass index is unknown because there is no height or weight on file..    Tobacco and alcohol use, intervention ordered if applicable.   Condom use for prevention of STI's.  Contraception:  N/A- post menopause    Problem List Items Addressed This Visit    None  Visit Diagnoses       Screening for cervical cancer    -  Primary    Encounter for other screening for malignant neoplasm of breast                No orders of the defined types were placed in this encounter.

## 2024-06-20 ENCOUNTER — TELEPHONE (OUTPATIENT)
Age: 64
End: 2024-06-20

## 2024-06-20 ENCOUNTER — ANNUAL EXAM (OUTPATIENT)
Dept: OBGYN CLINIC | Facility: MEDICAL CENTER | Age: 64
End: 2024-06-20
Payer: COMMERCIAL

## 2024-06-20 VITALS
HEART RATE: 81 BPM | OXYGEN SATURATION: 96 % | SYSTOLIC BLOOD PRESSURE: 136 MMHG | BODY MASS INDEX: 21.86 KG/M2 | WEIGHT: 136 LBS | HEIGHT: 66 IN | DIASTOLIC BLOOD PRESSURE: 74 MMHG

## 2024-06-20 DIAGNOSIS — Z12.11 SCREENING FOR COLON CANCER: ICD-10-CM

## 2024-06-20 DIAGNOSIS — Z12.4 SCREENING FOR CERVICAL CANCER: ICD-10-CM

## 2024-06-20 DIAGNOSIS — Z01.419 ENCOUNTER FOR GYNECOLOGICAL EXAMINATION (GENERAL) (ROUTINE) WITHOUT ABNORMAL FINDINGS: Primary | ICD-10-CM

## 2024-06-20 DIAGNOSIS — Z12.31 ENCOUNTER FOR SCREENING MAMMOGRAM FOR BREAST CANCER: ICD-10-CM

## 2024-06-20 DIAGNOSIS — N95.2 ATROPHIC VAGINITIS: ICD-10-CM

## 2024-06-20 DIAGNOSIS — Z12.39 ENCOUNTER FOR OTHER SCREENING FOR MALIGNANT NEOPLASM OF BREAST: ICD-10-CM

## 2024-06-20 PROBLEM — M79.672 PAIN IN LEFT FOOT: Status: ACTIVE | Noted: 2023-08-31

## 2024-06-20 PROBLEM — F41.1 GENERALIZED ANXIETY DISORDER: Status: ACTIVE | Noted: 2017-12-12

## 2024-06-20 PROBLEM — M51.36 LUMBAR DEGENERATIVE DISC DISEASE: Status: ACTIVE | Noted: 2021-04-15

## 2024-06-20 PROBLEM — M24.573 EQUINUS CONTRACTURE OF ANKLE: Status: ACTIVE | Noted: 2023-08-31

## 2024-06-20 PROBLEM — R53.82 CHRONIC FATIGUE: Status: ACTIVE | Noted: 2021-04-15

## 2024-06-20 PROBLEM — M20.5X2 ACQUIRED HALLUX LIMITUS OF LEFT FOOT: Status: ACTIVE | Noted: 2023-08-31

## 2024-06-20 PROBLEM — I35.1 AORTIC VALVE REGURGITATION: Status: ACTIVE | Noted: 2024-04-01

## 2024-06-20 PROBLEM — R91.1 PULMONARY NODULE: Status: ACTIVE | Noted: 2024-04-01

## 2024-06-20 PROBLEM — M51.369 LUMBAR DEGENERATIVE DISC DISEASE: Status: ACTIVE | Noted: 2021-04-15

## 2024-06-20 PROBLEM — R41.3 MEMORY PROBLEM: Status: ACTIVE | Noted: 2022-09-27

## 2024-06-20 PROBLEM — R94.31 ABNORMAL EKG: Status: ACTIVE | Noted: 2022-11-17

## 2024-06-20 PROBLEM — E78.2 MIXED HYPERLIPIDEMIA: Status: ACTIVE | Noted: 2021-04-15

## 2024-06-20 PROBLEM — J30.1 SEASONAL ALLERGIC RHINITIS DUE TO POLLEN: Status: ACTIVE | Noted: 2017-12-12

## 2024-06-20 PROBLEM — M76.70 PERONEAL TENDINITIS: Status: ACTIVE | Noted: 2023-08-31

## 2024-06-20 PROBLEM — E04.1 THYROID NODULE: Status: ACTIVE | Noted: 2023-03-03

## 2024-06-20 PROBLEM — Z82.49 FAMILY HISTORY OF AORTIC ANEURYSM: Status: ACTIVE | Noted: 2022-11-17

## 2024-06-20 PROBLEM — Z86.39 HISTORY OF THYROID DISORDER: Status: ACTIVE | Noted: 2017-12-12

## 2024-06-20 PROCEDURE — G0145 SCR C/V CYTO,THINLAYER,RESCR: HCPCS | Performed by: PHYSICIAN ASSISTANT

## 2024-06-20 PROCEDURE — G0476 HPV COMBO ASSAY CA SCREEN: HCPCS | Performed by: PHYSICIAN ASSISTANT

## 2024-06-20 PROCEDURE — S0612 ANNUAL GYNECOLOGICAL EXAMINA: HCPCS | Performed by: PHYSICIAN ASSISTANT

## 2024-06-20 RX ORDER — ESTRADIOL 0.1 MG/G
CREAM VAGINAL
Qty: 42.5 G | Refills: 1 | Status: SHIPPED | OUTPATIENT
Start: 2024-06-20

## 2024-06-20 NOTE — TELEPHONE ENCOUNTER
Patients provider:  Dr. Christensen    Number to return call: [phone]    Reason for call: Pt calling because she was not notified Dr Christensen had joined [facility] and where his office was. She would like to know when she is due for a colonoscopy and what the results were for her colonoscopy in 2023.    Scheduled procedure/appointment date if applicable: Nothing at this time

## 2024-06-20 NOTE — PROGRESS NOTES
Assessment   64 y.o.  presenting for annual exam.     Plan   Diagnoses and all orders for this visit:    Encounter for gynecological examination (general) (routine) without abnormal findings    Screening for cervical cancer  -     Liquid-based pap, screening    Encounter for other screening for malignant neoplasm of breast    Encounter for screening mammogram for breast cancer  -     Mammo screening bilateral w 3d & cad; Future    Screening for colon cancer  -     Cancel: Ambulatory Referral to Gastroenterology; Future    Atrophic vaginitis  -     estradiol (ESTRACE VAGINAL) 0.1 mg/g vaginal cream; Use 1 g vaginally 2 times per week.        Pap collected today  Mammo slip given    Colonoscopy - up to date; completed 2023; advised to contact GI to inquire about recall timing   Atrophic vaginitis- she would like to restart vaginal estrace cream; rx sent to pharmacy    Perineal hygiene reviewed. Weight bearing exercises minium of 150 mins/weekly advised. Kegel exercises recommended.   SBE encouraged, A yearly mammogram is recommended for breast cancer screening starting at age 40. ASCCP guidelines reviewed. Calcium/ Vit D dietary requirements discussed.   Advised to call with any issues, all concerns & questions addressed.   See provided information in your after visit summary     F/U Annually and PRN    Results will be released to [facility], if abnormal will call or message to review and discuss treatment plan.     __________________________________________________________________    Subjective     Taylor Gilmore is a 64 y.o.  presenting for annual exam.     She is not currently sexually active due to pain and dryness.  She has been given Estrace cream in the past to use for atrophic vaginitis.  She has stopped using this cream.  She states she does not like to take medication unless necessary.  Reviewed benefits of restarting Estrace cream such as less pain with intercourse, less vaginal dryness and  irritation.    SCREENING  Last Pap: 2019 neg/neg  Last Mammo: 09/10/2021 birads 1; had normal birads 2 breast MRI 2023  Last Colonoscopy: 2023 polyp noted; no recall given on op note; suspect 5 year follow up? Pt will call GI to clarify       GYN  , no VB     Sexually active: No    Hx Abnormal pap: denies  We reviewed ASCCP guidelines for Pap testing today.    Denies vaginal discharge, itching, odor, dyspareunia, pelvic pain and vulvar/vaginal symptoms      OB           Complaints: denies   Denies urgency, frequency, hematuria, leakage / change in stream, difficulty urinating.       BREAST  Complaints: denies   Denies: breast lump, breast tenderness, nipple discharge, skin color change, and skin lesion(s)  Personal hx: breast augmentation      Pertinent Family Hx:   Family hx of breast cancer: maternal aunt (40s)  Family hx of ovarian cancer: no  Family hx of colon cancer: paternal aunt (50s or 60s)      GENERAL  PMH reviewed/updated and is as below.   Patient does follow with a PCP.    SOCIAL  Smoking: no  Alcohol:no  Drug: no  Occupation: customer service       History reviewed. No pertinent past medical history.    History reviewed. No pertinent surgical history.      Current Outpatient Medications:     ascorbic acid (VITAMIN C) 1000 MG tablet, Take 1,000 mg by mouth daily, Disp: , Rfl:     calcium carbonate-vitamin D (OSCAL-D) 500 mg-200 units per tablet, Take 1 tablet by mouth 2 (two) times a day with meals, Disp: , Rfl:     citalopram (CeleXA) 20 mg tablet, , Disp: , Rfl:     Collagen Hydrolysate POWD, Take by mouth, Disp: , Rfl:     estradiol (ESTRACE VAGINAL) 0.1 mg/g vaginal cream, Use 1 g vaginally 2 times per week., Disp: 42.5 g, Rfl: 1    multivitamin (THERAGRAN) TABS, Take 1 tablet by mouth daily, Disp: , Rfl:     Omega-3 1000 MG CAPS, Take 2 g by mouth daily, Disp: , Rfl:     fexofenadine (ALLEGRA) 180 MG tablet, Take 180 mg by mouth daily (Patient not taking: Reported on  6/20/2024), Disp: , Rfl:     Allergies   Allergen Reactions    Minocycline Other (See Comments)     Other reaction(s): Unknown  itchy hands and feet       Social History     Socioeconomic History    Marital status: /Civil Union     Spouse name: Not on file    Number of children: Not on file    Years of education: Not on file    Highest education level: Not on file   Occupational History    Not on file   Tobacco Use    Smoking status: Never    Smokeless tobacco: Never   Vaping Use    Vaping status: Never Used   Substance and Sexual Activity    Alcohol use: Not Currently    Drug use: Never    Sexual activity: Not Currently     Birth control/protection: Post-menopausal   Other Topics Concern    Not on file   Social History Narrative    Not on file     Social Determinants of Health     Financial Resource Strain: Not on file   Food Insecurity: Not on file   Transportation Needs: Not on file   Physical Activity: Not on file   Stress: Not on file   Social Connections: Not on file   Intimate Partner Violence: Not on file   Housing Stability: Not on file       Review of Systems     ROS:  Constitutional: Negative for fatigue and unexpected weight change.   Respiratory: Negative for cough and shortness of breath.    Cardiovascular: Negative for chest pain and palpitations.   Gastrointestinal: Negative for abdominal pain and change in bowel habits  Breasts:  Negative, other than as noted above.   Genitourinary: Negative, other than as noted above.   Psychiatric: Negative for mood difficulties.      Objective        Vitals:    06/20/24 1047   BP: 136/74   Pulse: 81   SpO2: 96%       Physical Examination:    Patient appears well and is not in distress  Neck is supple without masses, no cervical or supraclavicular lymphadenopathy  Cardiovascular: regular rate and rhythm; no murmurs  Lungs: clear to auscultation bilaterally; no wheezes  Breasts are symmetrical without mass, tenderness, nipple discharge, skin changes or  adenopathy.   Abdomen is soft and nontender without masses.   External genitals are normal without lesions or rashes.  Urethral meatus and urethra are normal  Bladder is normal to palpation  Vagina is normal without discharge or bleeding.   Cervix is normal without discharge or lesion.   Uterus is normal, mobile, nontender without palpable mass.  Adnexa are normal, nontender, without palpable mass.

## 2024-06-20 NOTE — TELEPHONE ENCOUNTER
Patient calling as she states she received a call from the office. No current message in chart of a call made to patient. Call was warm transferred to the office.

## 2024-06-25 LAB
LAB AP GYN PRIMARY INTERPRETATION: NORMAL
Lab: NORMAL

## 2024-11-17 DIAGNOSIS — N95.2 ATROPHIC VAGINITIS: ICD-10-CM

## 2024-11-18 RX ORDER — ESTRADIOL 0.1 MG/G
CREAM VAGINAL
Qty: 42.5 G | Refills: 0 | Status: SHIPPED | OUTPATIENT
Start: 2024-11-18

## 2025-03-20 DIAGNOSIS — N95.2 ATROPHIC VAGINITIS: ICD-10-CM

## 2025-03-21 RX ORDER — ESTRADIOL 0.1 MG/G
CREAM VAGINAL
Qty: 42.5 G | Refills: 0 | Status: SHIPPED | OUTPATIENT
Start: 2025-03-21

## 2025-06-26 ENCOUNTER — ANNUAL EXAM (OUTPATIENT)
Dept: OBGYN CLINIC | Facility: MEDICAL CENTER | Age: 65
End: 2025-06-26
Payer: COMMERCIAL

## 2025-06-26 VITALS
BODY MASS INDEX: 21.05 KG/M2 | WEIGHT: 131 LBS | SYSTOLIC BLOOD PRESSURE: 92 MMHG | DIASTOLIC BLOOD PRESSURE: 64 MMHG | HEIGHT: 66 IN

## 2025-06-26 DIAGNOSIS — N95.8 GENITOURINARY SYNDROME OF MENOPAUSE: ICD-10-CM

## 2025-06-26 DIAGNOSIS — Z01.411 ENCNTR FOR GYN EXAM (GENERAL) (ROUTINE) W ABNORMAL FINDINGS: Primary | ICD-10-CM

## 2025-06-26 DIAGNOSIS — Z12.31 ENCOUNTER FOR SCREENING MAMMOGRAM FOR BREAST CANCER: ICD-10-CM

## 2025-06-26 PROCEDURE — S0612 ANNUAL GYNECOLOGICAL EXAMINA: HCPCS | Performed by: NURSE PRACTITIONER

## 2025-06-26 RX ORDER — MELATONIN 10 MG
TABLET, SUBLINGUAL SUBLINGUAL
COMMUNITY

## 2025-06-26 RX ORDER — TRETINOIN 0.25 MG/G
CREAM TOPICAL
COMMUNITY

## 2025-06-26 RX ORDER — ESTRADIOL 0.1 MG/G
CREAM VAGINAL
Qty: 42.5 G | Refills: 2 | Status: SHIPPED | OUTPATIENT
Start: 2025-06-26

## 2025-06-26 RX ORDER — ALBUTEROL SULFATE 90 UG/1
INHALANT RESPIRATORY (INHALATION)
COMMUNITY
Start: 2025-04-23 | End: 2025-06-26 | Stop reason: ALTCHOICE

## 2025-06-26 RX ORDER — KRILL/OM-3/DHA/EPA/PHOSPHO/AST 500-110 MG
CAPSULE ORAL
COMMUNITY

## 2025-06-26 RX ORDER — PREDNISONE 20 MG/1
TABLET ORAL
COMMUNITY
Start: 2025-04-23 | End: 2025-06-26 | Stop reason: ALTCHOICE

## 2025-06-26 RX ORDER — FLUTICASONE PROPIONATE 50 MCG
SPRAY, SUSPENSION (ML) NASAL
COMMUNITY
Start: 2025-04-23 | End: 2025-06-26 | Stop reason: ALTCHOICE

## 2025-06-26 NOTE — ASSESSMENT & PLAN NOTE
Estrace Rx sent to pharmacy on file. Use as directed. Aware of benefits, risks and alternatives.      Orders:    estradiol (ESTRACE) 0.1 mg/g vaginal cream; INSERT 0.5  GRAM VAGINALLY TWICE WEEKLY AS DIRECTED

## 2025-06-26 NOTE — PATIENT INSTRUCTIONS
Calcium 3863-5706 mg (in divided doses-max 600 mg at one time) + 600-1000 IU Vit D daily.   Exercise 150-300 minutes per week minimum including weight bearing exercises.   Pap with high risk HPV Q 5 years, if normal.  Due 2029  Call your insurance company to verify coverage prior to completing any ordered tests.   Annual mammogram ordered and monthly breast self exam recommended.  Annual compliance strongly recommended.   Colonoscopy- up to date  Kegels 20 times twice daily.   Estrace Rx sent to pharmacy on file. Use as directed. Aware of benefits, risks and alternatives.   Vaginal moisturizers twice weekly as needed.   Return to office in one year or sooner, if needed.

## 2025-06-26 NOTE — PROGRESS NOTES
Name: Taylor Gilmore      : 1960      MRN: [id number]  Encounter Provider: MANN Ward  Encounter Date: 2025   Encounter department: [facility] [place]  :  Assessment & Plan  Encntr for gyn exam (general) (routine) w abnormal findings  Calcium 3272-3380 mg (in divided doses-max 600 mg at one time) + 600-1000 IU Vit D daily.   Exercise 150-300 minutes per week minimum including weight bearing exercises.   Pap with high risk HPV Q 5 years, if normal.  Due   Call your insurance company to verify coverage prior to completing any ordered tests.   Annual mammogram ordered and monthly breast self exam recommended.    Colonoscopy- up to date  Kegels 20 times twice daily.   Estrace Rx sent to pharmacy on file. Use as directed. Aware of benefits, risks and alternatives.   Vaginal moisturizers twice weekly as needed.   Return to office in one year or sooner, if needed.        Encounter for screening mammogram for breast cancer  Mammogram ordered. Call to schedule.   Annual compliance strongly recommended.   Orders:    Mammo screening bilateral w 3d and cad; Future    Genitourinary syndrome of menopause  Estrace Rx sent to pharmacy on file. Use as directed. Aware of benefits, risks and alternatives.      Orders:    estradiol (ESTRACE) 0.1 mg/g vaginal cream; INSERT 0.5  GRAM VAGINALLY TWICE WEEKLY AS DIRECTED        History of Present Illness   HPI  Taylor Gilmore is a 65 y.o.  ( 25 yo boy, 23 yo twin boy and girl ) female who is here today for her annual visit. No gynecologic health concerns.   Menopausal with no vaginal bleeding.   Exercise- 9 times per month.   Works FT at [employer] in [place].     Taylor Gilmore is not sexually active with male partner/  of 34 years. Feels safe in this relationship. Denies vaginal pain,bleeding or dryness.    She is not interested in STD screening today.   She denies vaginal discharge, itching or pelvic  "pain.   She has no urinary concerns, does not have incontinence.  No bowel concerns.  No breast concerns.     Last pap: 06/20/2024 normal with negative HR HPV   Mammogram: 09/10/2021 normal with heterogeneously dense breast tissue ; gel implants in placed.   11/9/23 Breast MRI-> normal   Colonoscopy: 05/04/2013 recall 10 years  6/16/23 recall 10 years  DEXA scan: Not on file      Family history of cancer:   Cancer-related family history includes Breast cancer in her maternal aunt; Colon cancer in her paternal aunt. There is no history of Ovarian cancer, Cervical cancer, or Uterine cancer.    History obtained from: patient    Review of Systems   Constitutional: Negative.  Negative for activity change, appetite change, chills, diaphoresis, fatigue, fever and unexpected weight change.   HENT:  Negative for congestion, dental problem, sneezing, sore throat and trouble swallowing.    Eyes:  Negative for visual disturbance.   Respiratory:  Negative for chest tightness and shortness of breath.    Cardiovascular:  Negative for chest pain and leg swelling.   Gastrointestinal:  Negative for abdominal pain, constipation, diarrhea, nausea and vomiting.   Genitourinary:  Negative for difficulty urinating, dysuria, frequency, hematuria, pelvic pain, urgency, vaginal bleeding, vaginal discharge and vaginal pain.   Musculoskeletal:  Negative for back pain and neck pain.   Skin: Negative.    Allergic/Immunologic: Negative.    Neurological:  Negative for weakness and headaches.   Hematological:  Negative for adenopathy.   Psychiatric/Behavioral: Negative.       Medical History Reviewed by provider this encounter:  Tobacco  Allergies  Meds  Problems  Med Hx  Surg Hx  Fam Hx     .  Medications Ordered Prior to Encounter[1]   Social History[2]     Objective   BP 92/64 (BP Location: Left arm, Patient Position: Sitting, Cuff Size: Adult)   Ht 5' 5.5\" (1.664 m)   Wt 59.4 kg (131 lb)   BMI 21.47 kg/m²      Physical Exam  Vitals " and nursing note reviewed.   Constitutional:       Appearance: Normal appearance. She is well-developed.   HENT:      Head: Normocephalic.   Neck:      Thyroid: No thyromegaly.     Cardiovascular:      Rate and Rhythm: Normal rate and regular rhythm.      Heart sounds: Normal heart sounds.   Pulmonary:      Effort: Pulmonary effort is normal.      Breath sounds: Normal breath sounds.   Chest:   Breasts:     Breasts are symmetrical.      Right: Normal. No inverted nipple, mass, nipple discharge, skin change or tenderness.      Left: Normal. No inverted nipple, mass, nipple discharge, skin change or tenderness.   Abdominal:      Palpations: Abdomen is soft.   Genitourinary:     General: Normal vulva.      Exam position: Lithotomy position.      Labia:         Right: No rash, tenderness, lesion or injury.         Left: No rash, tenderness, lesion or injury.       Urethra: No prolapse, urethral pain, urethral swelling or urethral lesion.      Vagina: No signs of injury and foreign body. No vaginal discharge, erythema, tenderness, bleeding, lesions or prolapsed vaginal walls.      Cervix: Normal.      Uterus: Normal.       Adnexa: Right adnexa normal and left adnexa normal.        Right: No mass, tenderness or fullness.          Left: No mass, tenderness or fullness.        Rectum: No external hemorrhoid.      Comments: Vulvovaginal atrophy  Significant amount of estrace noted intravaginally     Musculoskeletal:         General: Normal range of motion.      Cervical back: Normal range of motion.   Lymphadenopathy:      Head:      Right side of head: No submental, submandibular, tonsillar or occipital adenopathy.      Left side of head: No submental, submandibular, tonsillar or occipital adenopathy.      Upper Body:      Right upper body: No supraclavicular or axillary adenopathy.      Left upper body: No supraclavicular or axillary adenopathy.      Lower Body: No right inguinal adenopathy. No left inguinal adenopathy.      Skin:     General: Skin is warm and dry.     Neurological:      Mental Status: She is alert and oriented to person, place, and time.     Psychiatric:         Mood and Affect: Mood normal.         Behavior: Behavior normal. Behavior is cooperative.                [1]   Current Outpatient Medications on File Prior to Visit   Medication Sig Dispense Refill    ascorbic acid (VITAMIN C) 1000 MG tablet Take 1,000 mg by mouth in the morning.      Calcium Carb-Cholecalciferol (Calcium 500 + D3) 500-15 MG-MCG TABS Take by mouth      citalopram (CeleXA) 20 mg tablet Take 20 mg by mouth daily      Krill Oil (Omega-3) 500 MG CAPS Take by mouth      multivitamin (THERAGRAN) TABS Take 1 tablet by mouth in the morning.      tretinoin (RETIN-A) 0.025 % cream daily at bedtime apply to affected area      [DISCONTINUED] albuterol (PROVENTIL HFA,VENTOLIN HFA) 90 mcg/act inhaler       [DISCONTINUED] Omega-3 1000 MG CAPS Take 2 g by mouth in the morning.      [DISCONTINUED] calcium carbonate-vitamin D (OSCAL-D) 500 mg-200 units per tablet Take 1 tablet by mouth 2 (two) times a day with meals      [DISCONTINUED] Collagen Hydrolysate POWD Take by mouth      [DISCONTINUED] fexofenadine (ALLEGRA) 180 MG tablet Take 180 mg by mouth daily (Patient not taking: Reported on 6/20/2024)      [DISCONTINUED] fluticasone (FLONASE) 50 mcg/act nasal spray  (Patient not taking: Reported on 6/26/2025)      [DISCONTINUED] Multiple Vitamins-Minerals (Multivitamin Women 50+) TABS Take by mouth      [DISCONTINUED] predniSONE 20 mg tablet        No current facility-administered medications on file prior to visit.   [2]   Social History  Tobacco Use    Smoking status: Never    Smokeless tobacco: Never   Vaping Use    Vaping status: Never Used   Substance and Sexual Activity    Alcohol use: Never    Drug use: Never    Sexual activity: Not Currently     Partners: Male     Birth control/protection: Post-menopausal